# Patient Record
Sex: FEMALE | NOT HISPANIC OR LATINO | URBAN - NONMETROPOLITAN AREA
[De-identification: names, ages, dates, MRNs, and addresses within clinical notes are randomized per-mention and may not be internally consistent; named-entity substitution may affect disease eponyms.]

---
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
## 2025-01-15 ENCOUNTER — OFFICE (OUTPATIENT)
Dept: URBAN - NONMETROPOLITAN AREA CLINIC 1 | Facility: CLINIC | Age: 20
End: 2025-01-15
Payer: COMMERCIAL

## 2025-01-15 VITALS
HEART RATE: 94 BPM | WEIGHT: 158 LBS | BODY MASS INDEX: 29.08 KG/M2 | DIASTOLIC BLOOD PRESSURE: 74 MMHG | HEIGHT: 62 IN | SYSTOLIC BLOOD PRESSURE: 128 MMHG

## 2025-01-15 DIAGNOSIS — K59.00 CONSTIPATION, UNSPECIFIED: ICD-10-CM

## 2025-01-15 DIAGNOSIS — J45.909 UNSPECIFIED ASTHMA, UNCOMPLICATED: ICD-10-CM

## 2025-01-15 DIAGNOSIS — I10 ESSENTIAL (PRIMARY) HYPERTENSION: ICD-10-CM

## 2025-01-15 PROCEDURE — 99213 OFFICE O/P EST LOW 20 MIN: CPT | Performed by: NURSE PRACTITIONER

## 2025-01-15 NOTE — SERVICENOTES
I gave her some samples of Trulance to try.  If she likes it, a prescription can be sent in for her.

## 2025-01-15 NOTE — SERVICEHPINOTES
The patient is a 19-year-old female with chronic constipation, comes in today for follow-up.  She initially saw [facility] about a year ago with chronic constipation and abdominal discomfort.  Her subsequent CT and labs were all normal.  She went for a colonoscopy 02/2024 by Dr. Sulliavn which was also normal (other than internal hemorrhoids).   She tried using Linzess and Amitiza, but they were too strong for her and caused cramping.  Sometimes she uses a stool softener or a laxative, but otherwise does not use anything to maintain regularity.    Her chronic illnesses are asthma and hypertension.
br
br     Today we talked about the importance of maintaining bowel regularity.  I have advised her to use a stool softener daily, or MiraLax. she should ensure adequate dietary fiber and water, along with exercise.